# Patient Record
Sex: MALE | Race: OTHER
[De-identification: names, ages, dates, MRNs, and addresses within clinical notes are randomized per-mention and may not be internally consistent; named-entity substitution may affect disease eponyms.]

---

## 2019-03-18 ENCOUNTER — HOSPITAL ENCOUNTER (OUTPATIENT)
Dept: HOSPITAL 76 - LAB.WCP | Age: 34
Discharge: HOME | End: 2019-03-18
Attending: NURSE PRACTITIONER
Payer: COMMERCIAL

## 2019-03-18 DIAGNOSIS — Z00.00: Primary | ICD-10-CM

## 2019-03-18 DIAGNOSIS — Z13.228: ICD-10-CM

## 2019-03-18 DIAGNOSIS — Z13.220: ICD-10-CM

## 2019-03-18 LAB
ALBUMIN DIAFP-MCNC: 4.2 G/DL (ref 3.2–5.5)
ALBUMIN/GLOB SERPL: 1.3 {RATIO} (ref 1–2.2)
ALP SERPL-CCNC: 79 IU/L (ref 42–121)
ALT SERPL W P-5'-P-CCNC: 39 IU/L (ref 10–60)
ANION GAP SERPL CALCULATED.4IONS-SCNC: 9 MMOL/L (ref 6–13)
AST SERPL W P-5'-P-CCNC: 23 IU/L (ref 10–42)
BASOPHILS NFR BLD AUTO: 0 10^3/UL (ref 0–0.1)
BASOPHILS NFR BLD AUTO: 0.3 %
BILIRUB BLD-MCNC: 0.5 MG/DL (ref 0.2–1)
BUN SERPL-MCNC: 6 MG/DL (ref 6–20)
CALCIUM UR-MCNC: 9 MG/DL (ref 8.5–10.3)
CHLORIDE SERPL-SCNC: 104 MMOL/L (ref 101–111)
CHOLEST SERPL-MCNC: 167 MG/DL
CO2 SERPL-SCNC: 27 MMOL/L (ref 21–32)
CREAT SERPLBLD-SCNC: 0.8 MG/DL (ref 0.6–1.2)
EOSINOPHIL # BLD AUTO: 0.1 10^3/UL (ref 0–0.7)
EOSINOPHIL NFR BLD AUTO: 1.3 %
ERYTHROCYTE [DISTWIDTH] IN BLOOD BY AUTOMATED COUNT: 13.9 % (ref 12–15)
GFRSERPLBLD MDRD-ARVRAT: 110 ML/MIN/{1.73_M2} (ref 89–?)
GLOBULIN SER-MCNC: 3.3 G/DL (ref 2.1–4.2)
GLUCOSE SERPL-MCNC: 92 MG/DL (ref 70–100)
HDLC SERPL-MCNC: 42 MG/DL
HDLC SERPL: 4 {RATIO} (ref ?–5)
HGB UR QL STRIP: 13.2 G/DL (ref 14–18)
LDLC SERPL CALC-MCNC: 94 MG/DL
LDLC/HDLC SERPL: 2.2 {RATIO} (ref ?–3.6)
LYMPHOCYTES # SPEC AUTO: 3.6 10^3/UL (ref 1.5–3.5)
LYMPHOCYTES NFR BLD AUTO: 47.7 %
MCH RBC QN AUTO: 27.3 PG (ref 27–31)
MCHC RBC AUTO-ENTMCNC: 33.5 G/DL (ref 32–36)
MCV RBC AUTO: 81.5 FL (ref 80–94)
MONOCYTES # BLD AUTO: 0.5 10^3/UL (ref 0–1)
MONOCYTES NFR BLD AUTO: 7.2 %
NEUTROPHILS # BLD AUTO: 3.3 10^3/UL (ref 1.5–6.6)
NEUTROPHILS # SNV AUTO: 7.5 X10^3/UL (ref 4.8–10.8)
NEUTROPHILS NFR BLD AUTO: 43.5 %
PDW BLD AUTO: 9.5 FL (ref 7.4–11.4)
PLATELET # BLD: 213 10^3/UL (ref 130–450)
PROT SPEC-MCNC: 7.5 G/DL (ref 6.7–8.2)
RBC MAR: 4.84 10^6/UL (ref 4.7–6.1)
SODIUM SERPLBLD-SCNC: 140 MMOL/L (ref 135–145)
VLDLC SERPL-SCNC: 31 MG/DL

## 2019-03-18 PROCEDURE — 85025 COMPLETE CBC W/AUTO DIFF WBC: CPT

## 2019-03-18 PROCEDURE — 36415 COLL VENOUS BLD VENIPUNCTURE: CPT

## 2019-03-18 PROCEDURE — 80061 LIPID PANEL: CPT

## 2019-03-18 PROCEDURE — 84443 ASSAY THYROID STIM HORMONE: CPT

## 2019-03-18 PROCEDURE — 83721 ASSAY OF BLOOD LIPOPROTEIN: CPT

## 2019-03-18 PROCEDURE — 80053 COMPREHEN METABOLIC PANEL: CPT

## 2020-04-03 ENCOUNTER — HOSPITAL ENCOUNTER (OUTPATIENT)
Dept: HOSPITAL 76 - COV | Age: 35
Discharge: HOME | End: 2020-04-03
Attending: FAMILY MEDICINE
Payer: COMMERCIAL

## 2020-04-03 DIAGNOSIS — R05: ICD-10-CM

## 2020-04-03 DIAGNOSIS — R50.9: Primary | ICD-10-CM

## 2020-04-03 PROCEDURE — 81599 UNLISTED MAAA: CPT

## 2021-03-15 ENCOUNTER — HOSPITAL ENCOUNTER (EMERGENCY)
Dept: HOSPITAL 76 - ED | Age: 36
Discharge: HOME | End: 2021-03-15
Payer: COMMERCIAL

## 2021-03-15 VITALS — DIASTOLIC BLOOD PRESSURE: 93 MMHG | SYSTOLIC BLOOD PRESSURE: 158 MMHG

## 2021-03-15 DIAGNOSIS — Z20.822: ICD-10-CM

## 2021-03-15 DIAGNOSIS — J06.9: Primary | ICD-10-CM

## 2021-03-15 DIAGNOSIS — R07.2: ICD-10-CM

## 2021-03-15 LAB
ALBUMIN DIAFP-MCNC: 4.4 G/DL (ref 3.2–5.5)
ALBUMIN/GLOB SERPL: 1.3 {RATIO} (ref 1–2.2)
ALP SERPL-CCNC: 82 IU/L (ref 42–121)
ALT SERPL W P-5'-P-CCNC: 34 IU/L (ref 10–60)
ANION GAP SERPL CALCULATED.4IONS-SCNC: 9 MMOL/L (ref 6–13)
AST SERPL W P-5'-P-CCNC: 23 IU/L (ref 10–42)
BASOPHILS NFR BLD AUTO: 0 10^3/UL (ref 0–0.1)
BASOPHILS NFR BLD AUTO: 0.2 %
BILIRUB BLD-MCNC: 0.6 MG/DL (ref 0.2–1)
BUN SERPL-MCNC: 15 MG/DL (ref 6–20)
CALCIUM UR-MCNC: 9.2 MG/DL (ref 8.5–10.3)
CHLORIDE SERPL-SCNC: 103 MMOL/L (ref 101–111)
CO2 SERPL-SCNC: 26 MMOL/L (ref 21–32)
CREAT SERPLBLD-SCNC: 0.7 MG/DL (ref 0.6–1.2)
EOSINOPHIL # BLD AUTO: 0 10^3/UL (ref 0–0.7)
EOSINOPHIL NFR BLD AUTO: 0.5 %
ERYTHROCYTE [DISTWIDTH] IN BLOOD BY AUTOMATED COUNT: 12.9 % (ref 12–15)
GFRSERPLBLD MDRD-ARVRAT: 127 ML/MIN/{1.73_M2} (ref 89–?)
GLOBULIN SER-MCNC: 3.4 G/DL (ref 2.1–4.2)
GLUCOSE SERPL-MCNC: 125 MG/DL (ref 70–100)
HCT VFR BLD AUTO: 43.2 % (ref 42–52)
HGB UR QL STRIP: 14.4 G/DL (ref 14–18)
LIPASE SERPL-CCNC: 51 U/L (ref 22–51)
LYMPHOCYTES # SPEC AUTO: 2.2 10^3/UL (ref 1.5–3.5)
LYMPHOCYTES NFR BLD AUTO: 27.3 %
MCH RBC QN AUTO: 26.8 PG (ref 27–31)
MCHC RBC AUTO-ENTMCNC: 33.3 G/DL (ref 32–36)
MCV RBC AUTO: 80.4 FL (ref 80–94)
MONOCYTES # BLD AUTO: 0.5 10^3/UL (ref 0–1)
MONOCYTES NFR BLD AUTO: 6.2 %
NEUTROPHILS # BLD AUTO: 5.4 10^3/UL (ref 1.5–6.6)
NEUTROPHILS # SNV AUTO: 8.2 X10^3/UL (ref 4.8–10.8)
NEUTROPHILS NFR BLD AUTO: 65.4 %
NRBC # BLD AUTO: 0 /100WBC
NRBC # BLD AUTO: 0 X10^3/UL
PDW BLD AUTO: 10.2 FL (ref 7.4–11.4)
PLATELET # BLD: 247 10^3/UL (ref 130–450)
POTASSIUM SERPL-SCNC: 3.6 MMOL/L (ref 3.5–5)
PROT SPEC-MCNC: 7.8 G/DL (ref 6.7–8.2)
RBC MAR: 5.37 10^6/UL (ref 4.7–6.1)
SODIUM SERPLBLD-SCNC: 138 MMOL/L (ref 135–145)

## 2021-03-15 PROCEDURE — 85025 COMPLETE CBC W/AUTO DIFF WBC: CPT

## 2021-03-15 PROCEDURE — 36415 COLL VENOUS BLD VENIPUNCTURE: CPT

## 2021-03-15 PROCEDURE — 80053 COMPREHEN METABOLIC PANEL: CPT

## 2021-03-15 PROCEDURE — 84484 ASSAY OF TROPONIN QUANT: CPT

## 2021-03-15 PROCEDURE — 93005 ELECTROCARDIOGRAM TRACING: CPT

## 2021-03-15 PROCEDURE — 83690 ASSAY OF LIPASE: CPT

## 2021-03-15 PROCEDURE — 87635 SARS-COV-2 COVID-19 AMP PRB: CPT

## 2021-03-15 PROCEDURE — 71045 X-RAY EXAM CHEST 1 VIEW: CPT

## 2021-03-15 PROCEDURE — 99284 EMERGENCY DEPT VISIT MOD MDM: CPT

## 2021-03-15 NOTE — ED PHYSICIAN DOCUMENTATION
PD HPI CHEST PAIN





- Stated complaint


Stated Complaint: CHEST PX





- History obtained from


History obtained from: Patient





- History of Present Illness


Timing - onset: How many days ago (onset of some sharp anterior chest pain 

intermittently yesterday and today.)


Timing - onset during: Other (coughing).  No: Exertion


Timing - details: Gradual onset (he has had cough and fever for the past few d

ays and started with some anterior chest pain with coughing and movement.), 

Intermittant


Quality: Aching, Sharp.  No: Pressure, Tightness


Location: Substernal, Left chest


Radiation: No: Neck, Back


Worsened by: Inspiration, Movement, Other (cough).  No: Eating, Palpation


Associated symptoms: Cough, Other (he flown to [place] last week for his uncles 

 and was around other family members. His mother had had COVID about 6 

weeks ago and was improved/better, so pt thought he was okay being around her.).

 No: Shortness of air, Nausea, Feeling faint / dizzy


Similar symptoms before: Has not had sx before


Recently seen: Clinic (went to Urgent Care first and referred to ER for further 

timely testing. Had ECG there that was negative.)





Review of Systems


Constitutional: reports: Fever, Chills, Myalgias


Nose: denies: Rhinorrhea / runny nose, Congestion


Throat: denies: Sore throat


Cardiac: reports: Chest pain / pressure.  denies: Palpitations, Pedal edema, 

Calf pain


Respiratory: reports: Dyspnea, Cough.  denies: Wheezing


GI: denies: Abdominal Pain, Nausea, Vomiting, Diarrhea


Skin: denies: Rash, Lesions





PD PAST MEDICAL HISTORY





- Past Medical History


Past Medical History: No


Cardiovascular: None


Respiratory: None


Neuro: None


Endocrine/Autoimmune: None





- Present Medications


Home Medications: 


                                Ambulatory Orders











 Medication  Instructions  Recorded  Confirmed


 


Albuterol Sulf [Ventolin Hfa 2 - 3 puffs INH Q4HR PRN #1 inhaler 03/15/21 





Inhaler]   


 


Benzonatate [Tessalon] 100 mg PO TID PRN #20 cap 03/15/21 


 


cephALEXin [Keflex] 500 mg PO TID #20 cap 03/15/21 


 


dexAMETHasone [Decadron] 4 mg PO DAILY #5 tablet 03/15/21 














- Allergies


Allergies/Adverse Reactions: 


                                    Allergies











Allergy/AdvReac Type Severity Reaction Status Date / Time


 


No Known Drug Allergies Allergy   Verified 03/15/21 13:45














PD ED PE NORMAL





- Vitals


Vital signs reviewed: Yes





- General


General: Alert and oriented X 3, No acute distress, Well developed/nourished





- HEENT


HEENT: Moist mucous membranes, Pharynx benign





- Neck


Neck: Supple, no meningeal sign, No adenopathy





- Cardiac


Cardiac: RRR, No murmur





- Respiratory


Respiratory: Clear bilaterally, Other (no chestwall tenderness)





- Abdomen


Abdomen: Soft, Non tender





- Derm


Derm: Normal color, Warm and dry





- Extremities


Extremities: Normal ROM s pain, No edema, No calf tenderness / cord





- Neuro


Neuro: Alert and oriented X 3, No motor deficit, Normal speech





Results





- Vitals


Vitals: 


                               Vital Signs - 24 hr











  03/15/21 03/15/21 03/15/21





  13:45 14:00 14:05


 


Temperature 36.3 C L  


 


Heart Rate 98 102 H 97


 


Respiratory 19 16 18





Rate   


 


Blood Pressure 139/81 H 143/110 H 138/103 H


 


O2 Saturation 98 99 99














  03/15/21 03/15/21 03/15/21





  14:30 15:00 15:25


 


Temperature   37.0 C


 


Heart Rate 96 86 87


 


Respiratory 14 13 16





Rate   


 


Blood Pressure 136/90 H 121/85 H 124/94 H


 


O2 Saturation 99 98 100














  03/15/21





  15:46


 


Temperature 36.7 C


 


Heart Rate 93


 


Respiratory 20





Rate 


 


Blood Pressure 158/93 H


 


O2 Saturation 96








                                     Oxygen











O2 Source                      Room air

















- EKG (time done)


  ** 13:44


Rate: Rate (enter#) (92)


Rhythm: NSR


Axis: Normal


Intervals: Normal FL


QRS: Normal


Ischemia: Normal ST segments.  No: ST elevation c/w ischemia, ST depression





- Labs


Labs: 


                                Laboratory Tests











  03/15/21 03/15/21 03/15/21





  14:34 14:34 14:34


 


WBC  8.2  


 


RBC  5.37  


 


Hgb  14.4  


 


Hct  43.2  


 


MCV  80.4  


 


MCH  26.8 L  


 


MCHC  33.3  


 


RDW  12.9  


 


Plt Count  247  


 


MPV  10.2  


 


Neut # (Auto)  5.4  


 


Lymph # (Auto)  2.2  


 


Mono # (Auto)  0.5  


 


Eos # (Auto)  0.0  


 


Baso # (Auto)  0.0  


 


Absolute Nucleated RBC  0.00  


 


Nucleated RBC %  0.0  


 


Sodium   138 


 


Potassium   3.6 


 


Chloride   103 


 


Carbon Dioxide   26 


 


Anion Gap   9.0 


 


BUN   15 


 


Creatinine   0.7 


 


Estimated GFR (MDRD)   127 


 


Glucose   125 H 


 


Calcium   9.2 


 


Total Bilirubin   0.6 


 


AST   23 


 


ALT   34 


 


Alkaline Phosphatase   82 


 


Troponin I High Sens    3.8


 


Total Protein   7.8 


 


Albumin   4.4 


 


Globulin   3.4 


 


Albumin/Globulin Ratio   1.3 


 


Lipase   51 














- Rads (name of study)


  ** chest xray


Radiology: Prelim report reviewed (no infiltrates), See rad report





PD MEDICAL DECISION MAKING





- ED course


Complexity details: reviewed results, considered differential (Has URI symptoms 

and some chest pain that seems like pleurisy/infection related. Normal ECG and 

trop. CXR clear. ), d/w patient





Departure





- Departure


Disposition:  Home, Self Care


Clinical Impression: 


Upper respiratory infection


Qualifiers:


 URI type: unspecified URI Qualified Code(s): J06.9 - Acute upper respiratory 

infection, unspecified





Chest pain


Qualifiers:


 Chest pain type: precordial pain Qualified Code(s): R07.2 - Precordial pain





Condition: Stable


Record reviewed to determine appropriate education?: Yes


Instructions:  ED Upper Resp Infec Abx Tx, ED Chest Pain Atypical Unkn Cause


Follow-Up: 


Chloe Enriquez ARNP, NP-C [Primary Care Provider] - 


Prescriptions: 


Albuterol Sulf [Ventolin Hfa Inhaler] 2 - 3 puffs INH Q4HR PRN #1 inhaler


 PRN Reason: Shortness Of Air/Wheezing


dexAMETHasone [Decadron] 4 mg PO DAILY #5 tablet


cephALEXin [Keflex] 500 mg PO TID #20 cap


Benzonatate [Tessalon] 100 mg PO TID PRN #20 cap


 PRN Reason: Cough


Comments: 


Your EKG and troponin test (blood test to look for heart injury) are normal.  

Your chest x-ray does not show any acute pneumonia nor collapsed lung nor fluid 

around it.





Your symptoms sound likely to be respiratory infection with some inflammation in

 the chest wall causing the pain.





We will treat this with an antibiotic for potential bacterial cause as well as 

an anti-inflammatory inhaler and medication for cough.





Add Tylenol if needed for pains.





Off work for the next 1 or 2 days for illness and also pending the Covid result.





Recheck if not improved well over the next several days and return if worsening.


Forms:  Activity restrictions


Discharge Date/Time: 03/15/21 15:48

## 2021-03-15 NOTE — XRAY REPORT
PROCEDURE:  Chest 1 View X-Ray

 

INDICATIONS:  Chest Pain

 

TECHNIQUE:  One view of the chest was acquired.  

 

COMPARISON:  None

 

FINDINGS:  

 

Surgical changes and devices:  None.  

 

Lungs and pleura:  No pleural effusions or pneumothorax.  Lungs are clear.  

 

Mediastinum:  Mediastinal contours appear normal.  Heart size is normal.  

 

Bones and chest wall:  No suspicious bony lesions.  Overlying soft tissues appear unremarkable.  

 

IMPRESSION:  

No acute pulmonary process.

 

Reviewed by: Chrissy Max MD on 3/15/2021 2:04 PM PDT

Approved by: Chrissy Max MD on 3/15/2021 2:04 PM PDT

 

 

Station ID:  [id number]

## 2021-08-09 ENCOUNTER — HOSPITAL ENCOUNTER (OUTPATIENT)
Dept: HOSPITAL 76 - COV | Age: 36
Discharge: HOME | End: 2021-08-09
Attending: FAMILY MEDICINE
Payer: COMMERCIAL

## 2021-08-09 DIAGNOSIS — Z20.822: Primary | ICD-10-CM

## 2021-08-18 ENCOUNTER — HOSPITAL ENCOUNTER (OUTPATIENT)
Dept: HOSPITAL 76 - DI.N | Age: 36
Discharge: HOME | End: 2021-08-18
Attending: FAMILY MEDICINE
Payer: COMMERCIAL

## 2021-08-18 ENCOUNTER — HOSPITAL ENCOUNTER (OUTPATIENT)
Dept: HOSPITAL 76 - LAB.N | Age: 36
Discharge: HOME | End: 2021-08-18
Attending: FAMILY MEDICINE
Payer: COMMERCIAL

## 2021-08-18 DIAGNOSIS — Z20.822: Primary | ICD-10-CM

## 2021-08-18 DIAGNOSIS — U07.1: Primary | ICD-10-CM

## 2021-08-18 NOTE — XRAY REPORT
PROCEDURE:  Chest 2 View X-Ray

 

INDICATIONS:  EXPOSURE TO COVID-19

 

TECHNIQUE:  2 view(s) of the chest.  

 

COMPARISON:  None.

 

FINDINGS:  

 

Surgical changes and devices:  None.  

 

Lungs and pleura:  No pleural effusions or pneumothorax.  Lungs are clear.  

 

Mediastinum:  Mediastinal contours are normal.  Heart size is normal.  

 

Bones and chest wall:  No suspicious bony abnormalities.  Soft tissues appear unremarkable.  

 

IMPRESSION:  No acute cardiopulmonary process demonstrated radiographically.

 

Reviewed by: Malik Mirza MD on 8/18/2021 9:51 AM PDT

Approved by: Malik Mirza MD on 8/18/2021 9:51 AM PDT

 

 

Station ID:  [id number]

## 2021-08-30 ENCOUNTER — HOSPITAL ENCOUNTER (OUTPATIENT)
Dept: HOSPITAL 76 - DI.N | Age: 36
Discharge: HOME | End: 2021-08-30
Attending: FAMILY MEDICINE
Payer: COMMERCIAL

## 2021-08-30 DIAGNOSIS — U07.1: Primary | ICD-10-CM

## 2021-08-30 NOTE — XRAY REPORT
PROCEDURE:  Chest 2 View X-Ray

 

INDICATIONS:  COVID-19 INFECTION

 

TECHNIQUE:  2 view(s) of the chest.  

 

COMPARISON:  Chest x-ray 8/18/2021.

 

FINDINGS:  

 

Surgical changes and devices:  None.  

 

Lungs and pleura:  No pleural effusions or pneumothorax.  Lungs are clear.  

 

Mediastinum:  Mediastinal contours are normal.  Heart size is normal.  

 

Bones and chest wall:  No suspicious bony abnormalities.  Soft tissues appear unremarkable.  

 

IMPRESSION:  No acute pulmonary process.

 

Reviewed by: Chrissy Max MD on 8/30/2021 4:22 PM PDT

Approved by: Chrissy Max MD on 8/30/2021 4:22 PM PDT

 

 

Station ID:  [id number]

## 2023-02-03 ENCOUNTER — HOSPITAL ENCOUNTER (OUTPATIENT)
Dept: HOSPITAL 76 - LAB.N | Age: 38
Discharge: HOME | End: 2023-02-03
Attending: PHYSICIAN ASSISTANT
Payer: COMMERCIAL

## 2023-02-03 DIAGNOSIS — Z13.29: ICD-10-CM

## 2023-02-03 DIAGNOSIS — Z13.9: ICD-10-CM

## 2023-02-03 DIAGNOSIS — E78.1: ICD-10-CM

## 2023-02-03 DIAGNOSIS — D64.9: Primary | ICD-10-CM

## 2023-02-03 LAB
ALBUMIN DIAFP-MCNC: 4.6 G/DL (ref 3.2–5.5)
ALBUMIN/GLOB SERPL: 1.4 {RATIO} (ref 1–2.2)
ALP SERPL-CCNC: 78 IU/L (ref 42–121)
ALT SERPL W P-5'-P-CCNC: 38 IU/L (ref 10–60)
ANION GAP SERPL CALCULATED.4IONS-SCNC: 11 MMOL/L (ref 6–13)
AST SERPL W P-5'-P-CCNC: 28 IU/L (ref 10–42)
BASOPHILS NFR BLD AUTO: 0 10^3/UL (ref 0–0.1)
BASOPHILS NFR BLD AUTO: 0.4 %
BILIRUB BLD-MCNC: 0.8 MG/DL (ref 0.2–1)
BUN SERPL-MCNC: 12 MG/DL (ref 6–20)
CALCIUM UR-MCNC: 9.2 MG/DL (ref 8.5–10.3)
CHLORIDE SERPL-SCNC: 99 MMOL/L (ref 101–111)
CHOLEST SERPL-MCNC: 255 MG/DL
CO2 SERPL-SCNC: 27 MMOL/L (ref 21–32)
CREAT SERPLBLD-SCNC: 0.8 MG/DL (ref 0.6–1.2)
EOSINOPHIL # BLD AUTO: 0 10^3/UL (ref 0–0.7)
EOSINOPHIL NFR BLD AUTO: 0.5 %
ERYTHROCYTE [DISTWIDTH] IN BLOOD BY AUTOMATED COUNT: 13.1 % (ref 12–15)
GFRSERPLBLD MDRD-ARVRAT: 108 ML/MIN/{1.73_M2} (ref 89–?)
GLOBULIN SER-MCNC: 3.3 G/DL (ref 2.1–4.2)
GLUCOSE SERPL-MCNC: 77 MG/DL (ref 70–100)
HCT VFR BLD AUTO: 43.1 % (ref 42–52)
HDLC SERPL-MCNC: 49 MG/DL
HDLC SERPL: 5.2 {RATIO} (ref ?–5)
HGB UR QL STRIP: 13.8 G/DL (ref 14–18)
LDLC SERPL CALC-MCNC: 173 MG/DL
LDLC/HDLC SERPL: 3.5 {RATIO} (ref ?–3.6)
LYMPHOCYTES # SPEC AUTO: 2.4 10^3/UL (ref 1.5–3.5)
LYMPHOCYTES NFR BLD AUTO: 28.4 %
MCH RBC QN AUTO: 26.7 PG (ref 27–31)
MCHC RBC AUTO-ENTMCNC: 32 G/DL (ref 32–36)
MCV RBC AUTO: 83.5 FL (ref 80–94)
MONOCYTES # BLD AUTO: 0.6 10^3/UL (ref 0–1)
MONOCYTES NFR BLD AUTO: 6.9 %
NEUTROPHILS # BLD AUTO: 5.5 10^3/UL (ref 1.5–6.6)
NEUTROPHILS # SNV AUTO: 8.6 X10^3/UL (ref 4.8–10.8)
NEUTROPHILS NFR BLD AUTO: 63.6 %
NRBC # BLD AUTO: 0 /100WBC
NRBC # BLD AUTO: 0 X10^3/UL
PDW BLD AUTO: 10.7 FL (ref 7.4–11.4)
PLATELET # BLD: 265 10^3/UL (ref 130–450)
POTASSIUM SERPL-SCNC: 3.8 MMOL/L (ref 3.5–5)
PROT SPEC-MCNC: 7.9 G/DL (ref 6.7–8.2)
RBC MAR: 5.16 10^6/UL (ref 4.7–6.1)
SODIUM SERPLBLD-SCNC: 137 MMOL/L (ref 135–145)
TRIGL P FAST SERPL-MCNC: 163 MG/DL
TSH SERPL-ACNC: 1.34 UIU/ML (ref 0.34–5.6)
VLDLC SERPL-SCNC: 33 MG/DL

## 2023-02-03 PROCEDURE — 80053 COMPREHEN METABOLIC PANEL: CPT

## 2023-02-03 PROCEDURE — 84443 ASSAY THYROID STIM HORMONE: CPT

## 2023-02-03 PROCEDURE — 36415 COLL VENOUS BLD VENIPUNCTURE: CPT

## 2023-02-03 PROCEDURE — 85025 COMPLETE CBC W/AUTO DIFF WBC: CPT

## 2023-02-03 PROCEDURE — 80061 LIPID PANEL: CPT

## 2023-02-03 PROCEDURE — 83721 ASSAY OF BLOOD LIPOPROTEIN: CPT

## 2023-06-07 ENCOUNTER — HOSPITAL ENCOUNTER (OUTPATIENT)
Dept: HOSPITAL 76 - DI.N | Age: 38
Discharge: HOME | End: 2023-06-07
Attending: PHYSICIAN ASSISTANT
Payer: COMMERCIAL

## 2023-06-07 DIAGNOSIS — M50.321: ICD-10-CM

## 2023-06-07 DIAGNOSIS — M47.812: Primary | ICD-10-CM

## 2023-06-08 NOTE — XRAY REPORT
PROCEDURE:  Cervical Spine Comp w/Flex/Ext

 

INDICATIONS:  NECK PAIN, CHRONIC

 

TECHNIQUE:  7 views of the cervical spine were acquired.  

 

COMPARISON: None.

 

FINDINGS:  

 

Bones:  No fractures or dislocations to the C7 level.  No suspicious bony lesions.  There is normal r
cy of motion between flexion and extension, with preserved normal bony alignment. Degenerative disc
 disease, moderate at C5-C6 and C6-C7, mild at C4-C5. Mild bilateral facet arthropathy scattered in c
ervical spine. 

 

On flexion and extension, there is mildly reduced range of motion and normal alignment. 

 

No significant foraminal stenoses on oblique views.

 

Soft tissues:  Prevertebral soft tissues are normal in thickness.  

 

IMPRESSION:  

 

1. Degenerative disc and facet disease in cervical spine.

2. Mildly reduced range of motion with preserved alignment.

3. No significant foraminal stenoses.

 

Reviewed by: Eleni Phillips MD on 6/8/2023 10:12 AM PDT

Approved by: Eleni Phillips MD on 6/8/2023 10:12 AM PDT

 

 

Station ID:  [id number]